# Patient Record
Sex: MALE | URBAN - METROPOLITAN AREA
[De-identification: names, ages, dates, MRNs, and addresses within clinical notes are randomized per-mention and may not be internally consistent; named-entity substitution may affect disease eponyms.]

---

## 2023-04-24 ENCOUNTER — WEB ENCOUNTER (OUTPATIENT)
Dept: URBAN - METROPOLITAN AREA CLINIC 128 | Facility: CLINIC | Age: 53
End: 2023-04-24

## 2023-04-27 ENCOUNTER — CLAIMS CREATED FROM THE CLAIM WINDOW (OUTPATIENT)
Dept: URBAN - METROPOLITAN AREA CLINIC 128 | Facility: CLINIC | Age: 53
End: 2023-04-27
Payer: SELF-PAY

## 2023-04-27 ENCOUNTER — DASHBOARD ENCOUNTERS (OUTPATIENT)
Age: 53
End: 2023-04-27

## 2023-04-27 VITALS
DIASTOLIC BLOOD PRESSURE: 76 MMHG | TEMPERATURE: 97.9 F | BODY MASS INDEX: 29.82 KG/M2 | HEIGHT: 71 IN | WEIGHT: 213 LBS | SYSTOLIC BLOOD PRESSURE: 128 MMHG

## 2023-04-27 DIAGNOSIS — Z12.11 COLON CANCER SCREENING: ICD-10-CM

## 2023-04-27 DIAGNOSIS — R09.89 CHRONIC THROAT CLEARING: ICD-10-CM

## 2023-04-27 PROCEDURE — 99203 OFFICE O/P NEW LOW 30 MIN: CPT | Performed by: INTERNAL MEDICINE

## 2023-04-27 NOTE — HPI-TODAY'S VISIT:
Pleasant 52yo gentleman with history of several years of constant clearing of the throat.  He offers the sensation of mucous in the throat that needs to be cleared.  He never really sees any mucous coming up into the mouth when he clears his throat.  This sensation has been evaluated by ENT with laryngoscopy.  He offers no issues with sinus drainage and has never taken antihistamines for this problem.  He denies any issues with pyrosis or bitter regurgitation.  No cough.  This sensation does sometimes increase with recumbency and after meals but really occurs constantly.  It does not wake him from sleep but it can make it more difficult to fall asleep.  No waking from sleep with coughing or choking.  No NSAIDs or ETOH.  BMs are regular with bleeding or discomfort.  He is due for colon cancer screening.  He has taken mulitple PPIs, full dose BID, for months with no improvement.  A barium swallow was performed which patient offers showed significant reflux.

## 2023-06-05 ENCOUNTER — TELEPHONE ENCOUNTER (OUTPATIENT)
Dept: URBAN - METROPOLITAN AREA CLINIC 19 | Facility: CLINIC | Age: 53
End: 2023-06-05

## 2023-06-07 ENCOUNTER — OFFICE VISIT (OUTPATIENT)
Dept: URBAN - METROPOLITAN AREA SURGERY CENTER 31 | Facility: SURGERY CENTER | Age: 53
End: 2023-06-07

## 2024-01-16 ENCOUNTER — CLAIMS CREATED FROM THE CLAIM WINDOW (OUTPATIENT)
Dept: URBAN - METROPOLITAN AREA CLINIC 4 | Facility: CLINIC | Age: 54
End: 2024-01-16
Payer: SELF-PAY

## 2024-01-16 ENCOUNTER — OUT OF OFFICE VISIT (OUTPATIENT)
Dept: URBAN - METROPOLITAN AREA SURGERY CENTER 31 | Facility: SURGERY CENTER | Age: 54
End: 2024-01-16
Payer: SELF-PAY

## 2024-01-16 DIAGNOSIS — K21.9 ACID REFLUX: ICD-10-CM

## 2024-01-16 DIAGNOSIS — K29.70 GASTRITIS: ICD-10-CM

## 2024-01-16 DIAGNOSIS — R09.A2 FOREIGN BODY SENSATION IN THROAT: ICD-10-CM

## 2024-01-16 DIAGNOSIS — K31.7 GASTRIC POLYPS: ICD-10-CM

## 2024-01-16 DIAGNOSIS — K31.89 ACHYLIA: ICD-10-CM

## 2024-01-16 DIAGNOSIS — R05.3 CHRONIC COUGH: ICD-10-CM

## 2024-01-16 DIAGNOSIS — Z12.11 COLON CANCER SCREENING (HIGH RISK): ICD-10-CM

## 2024-01-16 DIAGNOSIS — K31.7 BENIGN GASTRIC POLYP: ICD-10-CM

## 2024-01-16 DIAGNOSIS — K29.70 GASTRITIS, UNSPECIFIED, WITHOUT BLEEDING: ICD-10-CM

## 2024-01-16 DIAGNOSIS — Z12.11 COLON CANCER SCREENING: ICD-10-CM

## 2024-01-16 PROCEDURE — G8907 PT DOC NO EVENTS ON DISCHARG: HCPCS | Performed by: INTERNAL MEDICINE

## 2024-01-16 PROCEDURE — 88305 TISSUE EXAM BY PATHOLOGIST: CPT | Performed by: PATHOLOGY

## 2024-01-16 PROCEDURE — 88312 SPECIAL STAINS GROUP 1: CPT | Performed by: PATHOLOGY

## 2024-01-16 PROCEDURE — 43239 EGD BIOPSY SINGLE/MULTIPLE: CPT | Performed by: INTERNAL MEDICINE

## 2024-01-16 PROCEDURE — 00813 ANES UPR LWR GI NDSC PX: CPT | Performed by: NURSE ANESTHETIST, CERTIFIED REGISTERED

## 2024-01-16 PROCEDURE — G0121 COLON CA SCRN NOT HI RSK IND: HCPCS | Performed by: INTERNAL MEDICINE

## 2024-11-11 ENCOUNTER — OUT OF OFFICE VISIT (OUTPATIENT)
Dept: URBAN - METROPOLITAN AREA SURGERY CENTER 31 | Facility: SURGERY CENTER | Age: 54
End: 2024-11-11